# Patient Record
Sex: MALE | Race: WHITE | NOT HISPANIC OR LATINO
[De-identification: names, ages, dates, MRNs, and addresses within clinical notes are randomized per-mention and may not be internally consistent; named-entity substitution may affect disease eponyms.]

---

## 2021-06-29 PROBLEM — Z00.00 ENCOUNTER FOR PREVENTIVE HEALTH EXAMINATION: Status: ACTIVE | Noted: 2021-06-29

## 2021-07-09 ENCOUNTER — APPOINTMENT (OUTPATIENT)
Dept: NEPHROLOGY | Facility: CLINIC | Age: 81
End: 2021-07-09
Payer: MEDICARE

## 2021-07-09 ENCOUNTER — RESULT REVIEW (OUTPATIENT)
Age: 81
End: 2021-07-09

## 2021-07-09 VITALS
DIASTOLIC BLOOD PRESSURE: 81 MMHG | HEIGHT: 68 IN | SYSTOLIC BLOOD PRESSURE: 131 MMHG | OXYGEN SATURATION: 95 % | HEART RATE: 85 BPM | WEIGHT: 315 LBS | TEMPERATURE: 98.2 F | BODY MASS INDEX: 47.74 KG/M2

## 2021-07-09 DIAGNOSIS — E87.8 OTHER DISORDERS OF ELECTROLYTE AND FLUID BALANCE, NOT ELSEWHERE CLASSIFIED: ICD-10-CM

## 2021-07-09 DIAGNOSIS — E87.5 HYPERKALEMIA: ICD-10-CM

## 2021-07-09 DIAGNOSIS — E78.00 PURE HYPERCHOLESTEROLEMIA, UNSPECIFIED: ICD-10-CM

## 2021-07-09 PROCEDURE — 99205 OFFICE O/P NEW HI 60 MIN: CPT

## 2021-07-09 RX ORDER — LISINOPRIL 5 MG/1
5 TABLET ORAL DAILY
Refills: 0 | Status: ACTIVE | COMMUNITY
Start: 2021-07-09

## 2021-07-09 RX ORDER — SITAGLIPTIN AND METFORMIN HYDROCHLORIDE 50; 500 MG/1; MG/1
50-500 TABLET, FILM COATED ORAL DAILY
Refills: 0 | Status: ACTIVE | COMMUNITY
Start: 2021-07-09

## 2021-07-09 RX ORDER — ASPIRIN 81 MG/1
81 TABLET ORAL
Refills: 0 | Status: ACTIVE | COMMUNITY
Start: 2021-07-09

## 2021-07-09 RX ORDER — PRASUGREL 5 MG/1
5 TABLET, FILM COATED ORAL DAILY
Refills: 0 | Status: ACTIVE | COMMUNITY
Start: 2021-07-09

## 2021-07-09 RX ORDER — GABAPENTIN 100 MG/1
100 CAPSULE ORAL TWICE DAILY
Refills: 0 | Status: ACTIVE | COMMUNITY
Start: 2021-07-09

## 2021-07-09 RX ORDER — GEMFIBROZIL 600 MG/1
600 TABLET, FILM COATED ORAL DAILY
Refills: 0 | Status: ACTIVE | COMMUNITY
Start: 2021-07-09

## 2021-07-09 RX ORDER — SIMVASTATIN 20 MG/1
20 TABLET, FILM COATED ORAL
Refills: 0 | Status: ACTIVE | COMMUNITY
Start: 2021-07-09

## 2021-07-09 RX ORDER — GLIMEPIRIDE 1 MG/1
1 TABLET ORAL DAILY
Refills: 0 | Status: ACTIVE | COMMUNITY
Start: 2021-07-09

## 2021-07-09 NOTE — HISTORY OF PRESENT ILLNESS
[FreeTextEntry1] : Mr. 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 He said "if no one can cure me, then what's the use of seeing all these doctors?" in a very realistic and non-hostile way\par - He admitted to having urinary problems: hesitancy, frequency. He had been seeing a Urologist in the past but now lost to ff up\par - Denied NSAID use

## 2021-07-09 NOTE — REVIEW OF SYSTEMS
[Incontinence] : no incontinence [Hesitancy] : urinary hesitancy [Nocturia] : nocturia [Negative] : Heme/Lymph

## 2021-07-09 NOTE — CONSULT LETTER
[Dear  ___] : Dear  [unfilled], [Consult Letter:] : I had the pleasure of evaluating your patient, [unfilled]. [Please see my note below.] : Please see my note below. [Consult Closing:] : Thank you very much for allowing me to participate in the care of this patient.  If you have any questions, please do not hesitate to contact me. [Sincerely,] : Sincerely, [FreeTextEntry3] : Abbe Cowan\par Division of Hypertension and Nephrology\par Email: [email or web address] Tel: [phone]

## 2021-07-09 NOTE — PHYSICAL EXAM
[General Appearance - Alert] : alert [General Appearance - In No Acute Distress] : in no acute distress [General Appearance - Well Nourished] : well nourished [General Appearance - Well-Appearing] : healthy appearing [Sclera] : the sclera and conjunctiva were normal [Extraocular Movements] : extraocular movements were intact [Outer Ear] : the ears and nose were normal in appearance [Hearing Threshold Finger Rub Not [place]] : hearing was normal [Neck Appearance] : the appearance of the neck was normal [Neck Cervical Mass (___cm)] : no neck mass was observed [Jugular Venous Distention Increased] : there was no jugular-venous distention [Thyroid Diffuse Enlargement] : the thyroid was not enlarged [Respiration, Rhythm And Depth] : normal respiratory rhythm and effort [Exaggerated Use Of Accessory Muscles For Inspiration] : no accessory muscle use [Auscultation Breath Sounds / Voice Sounds] : lungs were clear to auscultation bilaterally [Apical Impulse] : the apical impulse was normal [Heart Rate And Rhythm] : heart rate was normal and rhythm regular [Heart Sounds] : normal S1 and S2 [Murmurs] : no murmurs [Bowel Sounds] : normal bowel sounds [Abdomen Soft] : soft [Abdomen Tenderness] : non-tender [Abdomen Mass (___ Cm)] : no abdominal mass palpated [No CVA Tenderness] : no ~M costovertebral angle tenderness [No Spinal Tenderness] : no spinal tenderness [Abnormal Walk] : normal gait [Nail Clubbing] : no clubbing  or cyanosis of the fingernails [Involuntary Movements] : no involuntary movements were seen [Musculoskeletal - Swelling] : no joint swelling seen [Skin Color & Pigmentation] : normal skin color and pigmentation [] : no rash [Skin Lesions] : no skin lesions [Cranial Nerves] : cranial nerves 2-12 were intact [No Focal Deficits] : no focal deficits [Oriented To Time, Place, And Person] : oriented to person, place, and time [Impaired Insight] : insight and judgment were intact [Affect] : the affect was normal [Mood] : the mood was normal [Memory Recent] : recent memory was not impaired

## 2021-07-09 NOTE — ASSESSMENT
[FreeTextEntry1] : Mr. DANNIE JACOBO is an 81yo M being seen for CKD\par \par --------------------------------------------------\par # CKD VS BETITO ON CKD\par - BCr: I only have a lab test from 6/25/21 with a Cr of 2.13. Pt now aware of previous kidney disease\par - U/a: 1.015, AMY +1\par --- RISK FACTORS\par 1) DMN as the pt also has retinopathy and neuropathy\par 2) Obstructive uropathy as pt has known prostatomegaly now w/ symptoms\par 3) HTN, Age\par --- PLAN:\par Pt with poor understanding of his chronic conditions, saying he's old enough and feel great anyway. Discussed plan for risk factor mitigation as his goals are to continue staying active and to live a full life. I told him that if his kidneys fail, he may have to go on dialysis which will definitely slow him down. Given his strong outlook on life, I will reconcile his wishes to have less interventions by doctors but at the same time, intervene to make sure his CKD does not progress\par 1) Repeat renal panel today to trend\par 2) UPC, UMACR\par 3) PTH levels\par 4) Renal sono\par \par # HYPERKALEMIA\par - Insulin deficiency vs CKD\par - Repeat renal panel\par - Patient does not like dietary restriction, but will likely need to hold back on certain foods and/or start Lokelma if K remains persistently high\par \par # DISORDERS OF FLUIDS AND ACID-BASE\par -  No edema\par - Acidemia: repeat renal panel. May need alkalinization treatment if still low.\par \par # CKD-MBD\par - PTH levels\par \par # HYPERTENSION\par - Currently controlled with current regimen\par \par ANTICIPATE FF UP IN 3 MONTHS IF SCR STABLE

## 2021-07-13 LAB
ALBUMIN SERPL ELPH-MCNC: 4.8 G/DL
ANION GAP SERPL CALC-SCNC: 15 MMOL/L
APPEARANCE: CLEAR
BACTERIA: NEGATIVE
BILIRUBIN URINE: NEGATIVE
BLOOD URINE: NEGATIVE
BUN SERPL-MCNC: 39 MG/DL
CALCIUM SERPL-MCNC: 9.7 MG/DL
CHLORIDE SERPL-SCNC: 108 MMOL/L
CO2 SERPL-SCNC: 21 MMOL/L
COLOR: NORMAL
CREAT SERPL-MCNC: 2.25 MG/DL
CREAT SPEC-SCNC: 109 MG/DL
CREAT SPEC-SCNC: 109 MG/DL
CREAT/PROT UR: 0.3 RATIO
GLUCOSE QUALITATIVE U: NEGATIVE
GLUCOSE SERPL-MCNC: 215 MG/DL
HYALINE CASTS: 0 /LPF
KETONES URINE: NEGATIVE
LEUKOCYTE ESTERASE URINE: NEGATIVE
MICROALBUMIN 24H UR DL<=1MG/L-MCNC: 10.9 MG/DL
MICROALBUMIN/CREAT 24H UR-RTO: 100 MG/G
MICROSCOPIC-UA: NORMAL
NITRITE URINE: NEGATIVE
PH URINE: 6
PHOSPHATE SERPL-MCNC: 4.1 MG/DL
POTASSIUM SERPL-SCNC: 5 MMOL/L
PROT UR-MCNC: 28 MG/DL
PROTEIN URINE: NORMAL
RED BLOOD CELLS URINE: 1 /HPF
SODIUM SERPL-SCNC: 144 MMOL/L
SPECIFIC GRAVITY URINE: 1.02
SQUAMOUS EPITHELIAL CELLS: 0 /HPF
UROBILINOGEN URINE: NORMAL
WHITE BLOOD CELLS URINE: 0 /HPF

## 2021-07-21 LAB — PTH RELATED PROT SERPL-MCNC: <2 PMOL/L

## 2021-08-09 ENCOUNTER — APPOINTMENT (OUTPATIENT)
Dept: ULTRASOUND IMAGING | Facility: IMAGING CENTER | Age: 81
End: 2021-08-09
Payer: MEDICARE

## 2021-08-09 ENCOUNTER — OUTPATIENT (OUTPATIENT)
Dept: OUTPATIENT SERVICES | Facility: HOSPITAL | Age: 81
LOS: 1 days | End: 2021-08-09
Payer: MEDICARE

## 2021-08-09 DIAGNOSIS — E11.9 TYPE 2 DIABETES MELLITUS WITHOUT COMPLICATIONS: ICD-10-CM

## 2021-08-09 PROCEDURE — 76770 US EXAM ABDO BACK WALL COMP: CPT | Mod: 26

## 2021-08-09 PROCEDURE — 76770 US EXAM ABDO BACK WALL COMP: CPT

## 2022-04-01 ENCOUNTER — APPOINTMENT (OUTPATIENT)
Dept: NEPHROLOGY | Facility: CLINIC | Age: 82
End: 2022-04-01
Payer: MEDICARE

## 2022-04-01 VITALS
TEMPERATURE: 97.7 F | WEIGHT: 180 LBS | SYSTOLIC BLOOD PRESSURE: 144 MMHG | HEART RATE: 71 BPM | OXYGEN SATURATION: 96 % | DIASTOLIC BLOOD PRESSURE: 93 MMHG

## 2022-04-01 PROCEDURE — 99214 OFFICE O/P EST MOD 30 MIN: CPT

## 2022-04-01 NOTE — PHYSICAL EXAM
[General Appearance - Alert] : alert [General Appearance - In No Acute Distress] : in no acute distress [General Appearance - Well Nourished] : well nourished [General Appearance - Well-Appearing] : healthy appearing [Sclera] : the sclera and conjunctiva were normal [Extraocular Movements] : extraocular movements were intact [Outer Ear] : the ears and nose were normal in appearance [Hearing Threshold Finger Rub Not [place]] : hearing was normal [Neck Appearance] : the appearance of the neck was normal [Neck Cervical Mass (___cm)] : no neck mass was observed [Jugular Venous Distention Increased] : there was no jugular-venous distention [Thyroid Diffuse Enlargement] : the thyroid was not enlarged [Respiration, Rhythm And Depth] : normal respiratory rhythm and effort [Exaggerated Use Of Accessory Muscles For Inspiration] : no accessory muscle use [Auscultation Breath Sounds / Voice Sounds] : lungs were clear to auscultation bilaterally [Heart Rate And Rhythm] : heart rate was normal and rhythm regular [Apical Impulse] : the apical impulse was normal [Heart Sounds] : normal S1 and S2 [Murmurs] : no murmurs [Bowel Sounds] : normal bowel sounds [Abdomen Soft] : soft [Abdomen Tenderness] : non-tender [Abdomen Mass (___ Cm)] : no abdominal mass palpated [No Spinal Tenderness] : no spinal tenderness [No CVA Tenderness] : no ~M costovertebral angle tenderness [Nail Clubbing] : no clubbing  or cyanosis of the fingernails [Abnormal Walk] : normal gait [Involuntary Movements] : no involuntary movements were seen [Musculoskeletal - Swelling] : no joint swelling seen [Skin Color & Pigmentation] : normal skin color and pigmentation [] : no rash [Skin Lesions] : no skin lesions [Cranial Nerves] : cranial nerves 2-12 were intact [No Focal Deficits] : no focal deficits [Oriented To Time, Place, And Person] : oriented to person, place, and time [Impaired Insight] : insight and judgment were intact [Affect] : the affect was normal [Mood] : the mood was normal [Memory Recent] : recent memory was not impaired

## 2022-04-01 NOTE — HISTORY OF PRESENT ILLNESS
[FreeTextEntry1] : Mr. 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 He said "if no one can cure me, then what's the use of seeing all these doctors?" in a very realistic and non-hostile way\par - He admitted to having urinary problems: hesitancy, frequency. He had been seeing a Urologist in the past but now lost to ff up\par - Denied NSAID use\par \par ----------------------------------------------\par 4/1/22\par - Following up after a long time. Very busy with work and businesses\par - SYMPTOMS: No urinary symptoms\par - DM: Has not checked A1c in a while. Admits dietary indiscretion\par - HTN: "Controlled" at home. Only take Lisinopril.

## 2022-04-01 NOTE — ASSESSMENT
[FreeTextEntry1] : Mr. DANNIE JACOBO is an 79yo M being seen for CKD\par \par --------------------------------------------------\par # CKD 3-4\par - BASELINE: <2.1 to 2.2> Not a lot of data before 1st visit with me\par - TREND: 2.13-- 2.25 (7/2021)\par - PROTEINURIA: 0.3\par - SEROLOGIES: None ordered. Low-level isolated proteinuria. \par - US: No hydro (7/2021)\par --- RISK FACTORS\par 1) DMN as the pt also has retinopathy and neuropathy\par 2) HTN, Age\par 3) Prostatomegaly\par --- PLAN:\par Patient with the "less is more" approach at his age. Understood CKD but has not been very compliant because of his busy lifestyle. \par 1) CKD workup: renal panel, CBC, iron, PTH\par 2) Discussed risk factor mitigation with BP and BS control. Counseled re dietary discretion\par 3) ANEMIA: Repeat CBC, iron\par 4) MBD: Repeat Phos, PTH\par 5) PROTEINURIA MEDS:\par > On lisinopril 5mg. Will increase to 10 if BP permits\par > Hold SGLT2i 2/2 eGFR\par > Hold MRA for now\par \par # HYPERTENSION\par - Current meds:\par 1) Lisinopril 5mg\par --- PLAN:\par 1) Check BP at home\par 2) Will increase to 10mg if BP permits\par \par =========================================\par FOLLOW-UP IN 3 MONTHS\par \par

## 2022-04-04 ENCOUNTER — NON-APPOINTMENT (OUTPATIENT)
Age: 82
End: 2022-04-04

## 2022-04-05 ENCOUNTER — NON-APPOINTMENT (OUTPATIENT)
Age: 82
End: 2022-04-05

## 2022-04-05 DIAGNOSIS — N18.4 CHRONIC KIDNEY DISEASE, STAGE 4 (SEVERE): ICD-10-CM

## 2022-04-05 LAB
24R-OH-CALCIDIOL SERPL-MCNC: 11.9 PG/ML
25(OH)D3 SERPL-MCNC: 15.2 NG/ML
ALBUMIN SERPL ELPH-MCNC: 5 G/DL
ANION GAP SERPL CALC-SCNC: 16 MMOL/L
APPEARANCE: CLEAR
BACTERIA: NEGATIVE
BASOPHILS # BLD AUTO: 0.08 K/UL
BASOPHILS NFR BLD AUTO: 0.9 %
BILIRUBIN URINE: NEGATIVE
BLOOD URINE: NEGATIVE
BUN SERPL-MCNC: 36 MG/DL
CALCIUM SERPL-MCNC: 10.9 MG/DL
CALCIUM SERPL-MCNC: 10.9 MG/DL
CHLORIDE SERPL-SCNC: 109 MMOL/L
CHOLEST SERPL-MCNC: 193 MG/DL
CO2 SERPL-SCNC: 17 MMOL/L
COLOR: NORMAL
CREAT SERPL-MCNC: 2.16 MG/DL
CREAT SPEC-SCNC: 98 MG/DL
CREAT/PROT UR: 0.3 RATIO
EGFR: 30 ML/MIN/1.73M2
EOSINOPHIL # BLD AUTO: 0.35 K/UL
EOSINOPHIL NFR BLD AUTO: 4 %
ESTIMATED AVERAGE GLUCOSE: 148 MG/DL
GLUCOSE QUALITATIVE U: NEGATIVE
GLUCOSE SERPL-MCNC: 118 MG/DL
HBA1C MFR BLD HPLC: 6.8 %
HCT VFR BLD CALC: 45 %
HDLC SERPL-MCNC: 34 MG/DL
HGB BLD-MCNC: 14.3 G/DL
HYALINE CASTS: 0 /LPF
IMM GRANULOCYTES NFR BLD AUTO: 0.3 %
IRON SATN MFR SERPL: 21 %
IRON SERPL-MCNC: 86 UG/DL
KETONES URINE: NEGATIVE
LDLC SERPL CALC-MCNC: 88 MG/DL
LEUKOCYTE ESTERASE URINE: NEGATIVE
LYMPHOCYTES # BLD AUTO: 2.83 K/UL
LYMPHOCYTES NFR BLD AUTO: 32.3 %
MAN DIFF?: NORMAL
MCHC RBC-ENTMCNC: 29.1 PG
MCHC RBC-ENTMCNC: 31.8 GM/DL
MCV RBC AUTO: 91.5 FL
MICROSCOPIC-UA: NORMAL
MONOCYTES # BLD AUTO: 0.55 K/UL
MONOCYTES NFR BLD AUTO: 6.3 %
NEUTROPHILS # BLD AUTO: 4.93 K/UL
NEUTROPHILS NFR BLD AUTO: 56.2 %
NITRITE URINE: NEGATIVE
NONHDLC SERPL-MCNC: 159 MG/DL
PARATHYROID HORMONE INTACT: 25 PG/ML
PH URINE: 6
PHOSPHATE SERPL-MCNC: 3.8 MG/DL
PLATELET # BLD AUTO: 180 K/UL
POTASSIUM SERPL-SCNC: 4.7 MMOL/L
PROT UR-MCNC: 25 MG/DL
PROTEIN URINE: NORMAL
RBC # BLD: 4.92 M/UL
RBC # FLD: 13.1 %
RED BLOOD CELLS URINE: 0 /HPF
SODIUM SERPL-SCNC: 142 MMOL/L
SPECIFIC GRAVITY URINE: 1.02
SQUAMOUS EPITHELIAL CELLS: 0 /HPF
TIBC SERPL-MCNC: 410 UG/DL
TRIGL SERPL-MCNC: 356 MG/DL
TSH SERPL-ACNC: 1.56 UIU/ML
UIBC SERPL-MCNC: 324 UG/DL
UROBILINOGEN URINE: NORMAL
WBC # FLD AUTO: 8.77 K/UL
WHITE BLOOD CELLS URINE: 0 /HPF

## 2022-04-05 RX ORDER — SODIUM BICARBONATE 650 MG/1
650 TABLET ORAL TWICE DAILY
Qty: 60 | Refills: 2 | Status: ACTIVE | COMMUNITY
Start: 2022-04-05 | End: 1900-01-01

## 2023-08-25 ENCOUNTER — APPOINTMENT (OUTPATIENT)
Dept: PODIATRY | Facility: CLINIC | Age: 83
End: 2023-08-25
Payer: MEDICARE

## 2023-08-25 DIAGNOSIS — N20.0 CALCULUS OF KIDNEY: ICD-10-CM

## 2023-08-25 DIAGNOSIS — G62.89 OTHER SPECIFIED POLYNEUROPATHIES: ICD-10-CM

## 2023-08-25 DIAGNOSIS — G62.9 POLYNEUROPATHY, UNSPECIFIED: ICD-10-CM

## 2023-08-25 DIAGNOSIS — L60.0 INGROWING NAIL: ICD-10-CM

## 2023-08-25 DIAGNOSIS — E11.9 TYPE 2 DIABETES MELLITUS W/OUT COMPLICATIONS: ICD-10-CM

## 2023-08-25 PROCEDURE — 99203 OFFICE O/P NEW LOW 30 MIN: CPT

## 2023-08-28 PROBLEM — N20.0 BILATERAL KIDNEY STONES: Status: RESOLVED | Noted: 2023-08-25 | Resolved: 2023-08-28

## 2023-08-28 NOTE — HISTORY OF PRESENT ILLNESS
[Sneakers] : ambika [FreeTextEntry1] : Patient presents today for diabetic pedal evaluation. He denies any history of trauma to his feet. He does get some tingling and numbness in both lower extremities. The patient has seen neurologist in the past. He was given Gabapentin. He also has seen his cardiothoracic surgeon and vascular in the past who states he has a blockage in the left lower extremity but no angio or further work-up is warranted at this time. He denies any claudication type symptoms. His last hemoglobin A1c was 6.8. He last saw his primary care physician, Dr. Santacruz, one week ago. He states he has been having difficulty walking. He feels like he is off-balance. Denies any recent history or falls. The patient is wearing loafer type shoes which are Tod's which he states he always wears. He does not wear sneakers and does not wear shoes or slippers in the house due to fear of falling.

## 2023-08-28 NOTE — ASSESSMENT
[FreeTextEntry1] : Impression: Diabetic with peripheral neuropathy. Difficulty walking. Ingrown nail.  Treatment: Discussed findings and condition with the patient along with proper diabetic pedal care. He is to check his feet daily and avoid walking barefoot. Moisturize his feet daily as needed. Discussed slipper for the house with a closed back to avoid slippage. Discussed shoe gear with the patient. On the Tod's he has small bumps on the sole which could be contributing to instability.  He was advised to avoid a particular type of Tod's, driving loafers. Additional shoe gear was also discussed with the patient. The left hallux nail was prepped and the offending portion of nail was removed via distal slant-back. Antibiotic ointment was applied. The patient is to return in approximately 2 months for follow-up. any pain, redness, problems or concerns he is to contact the office.

## 2023-08-28 NOTE — PHYSICAL EXAM
[Varicose Veins Of Lower Extremities] : not present [1+] : left foot dorsalis pedis 1+ [FreeTextEntry3] : Weaker PT palpable on the left. CFT: 4 seconds x10  Temperature gradient is cooler distally.  Negative hair growth.  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 There is crepitation and it is track bound. [Diminished Throughout Right Foot] : normal sensation with monofilament testing throughout right foot [Diminished Throughout Left Foot] : normal sensation with monofilament testing throughout left foot [FreeTextEntry4] : vibratory absent at the 1st MPJ and decreased at the medial malleoli  [FreeTextEntry8] : vibratory absent at the 1st MPJ and decreased at the medial malleoli  [FreeTextEntry1] : Negative Tinel's sign bilaterally.

## 2024-12-16 ENCOUNTER — NON-APPOINTMENT (OUTPATIENT)
Age: 84
End: 2024-12-16

## 2024-12-16 ENCOUNTER — APPOINTMENT (OUTPATIENT)
Dept: OPHTHALMOLOGY | Facility: CLINIC | Age: 84
End: 2024-12-16
Payer: MEDICARE

## 2024-12-16 PROCEDURE — 92136 OPHTHALMIC BIOMETRY: CPT

## 2024-12-16 PROCEDURE — 92004 COMPRE OPH EXAM NEW PT 1/>: CPT

## 2024-12-16 PROCEDURE — 92025 CPTRIZED CORNEAL TOPOGRAPHY: CPT

## 2024-12-16 PROCEDURE — 92286 ANT SGM IMG I&R SPECLR MIC: CPT
